# Patient Record
Sex: FEMALE | Race: WHITE | Employment: OTHER | URBAN - METROPOLITAN AREA
[De-identification: names, ages, dates, MRNs, and addresses within clinical notes are randomized per-mention and may not be internally consistent; named-entity substitution may affect disease eponyms.]

---

## 2017-05-29 ENCOUNTER — HOSPITAL ENCOUNTER (EMERGENCY)
Age: 82
Discharge: HOME | End: 2017-05-29
Attending: EMERGENCY MEDICINE
Payer: MEDICARE

## 2017-05-29 VITALS
HEART RATE: 83 BPM | TEMPERATURE: 98 F | DIASTOLIC BLOOD PRESSURE: 77 MMHG | SYSTOLIC BLOOD PRESSURE: 156 MMHG | RESPIRATION RATE: 16 BRPM | OXYGEN SATURATION: 99 %

## 2017-05-29 DIAGNOSIS — H91.91 HEARING LOSS OF RIGHT EAR, UNSPECIFIED HEARING LOSS TYPE: Primary | ICD-10-CM

## 2017-05-29 PROCEDURE — 99282 EMERGENCY DEPT VISIT SF MDM: CPT

## 2017-05-29 RX ORDER — CLOPIDOGREL BISULFATE 75 MG/1
75 TABLET ORAL
COMMUNITY

## 2017-05-29 RX ORDER — METOPROLOL SUCCINATE 50 MG/1
50 TABLET, EXTENDED RELEASE ORAL DAILY
COMMUNITY

## 2017-05-29 RX ORDER — RAMIPRIL 10 MG/1
10 CAPSULE ORAL DAILY
COMMUNITY

## 2017-05-29 RX ORDER — MELATONIN
1000 DAILY
COMMUNITY

## 2017-05-29 RX ORDER — PRAVASTATIN SODIUM 20 MG/1
20 TABLET ORAL
COMMUNITY

## 2017-05-29 RX ORDER — PREDNISONE 10 MG/1
TABLET ORAL
Qty: 21 TAB | Refills: 0 | Status: SHIPPED | OUTPATIENT
Start: 2017-05-29

## 2017-05-29 RX ORDER — CALCIUM CARBONATE 600 MG
1200 TABLET ORAL 2 TIMES DAILY
COMMUNITY

## 2017-05-29 RX ORDER — LORAZEPAM 0.5 MG/1
0.5 TABLET ORAL
COMMUNITY

## 2017-05-29 RX ORDER — PREDNISONE 20 MG/1
60 TABLET ORAL
Status: DISCONTINUED | OUTPATIENT
Start: 2017-05-29 | End: 2017-05-29

## 2017-05-29 NOTE — ED NOTES
Patient (s)  given copy of dc instructions and `1 script(s). Patient (s)  verbalized understanding of instructions and script (s). Patient given a current medication reconciliation form and verbalized understanding of their medications. Patient (s) verbalized understanding of the importance of discussing medications with  his or her physician or clinic they will be following up with. Patient alert and oriented and in no acute distress. Patient discharged home ambulatory with family.

## 2017-05-29 NOTE — DISCHARGE INSTRUCTIONS
Hearing Loss: Care Instructions  Your Care Instructions  Hearing loss is a sudden or slow decrease in how well you hear. It can range from mild to severe. Permanent hearing loss can occur with aging. It also can happen when you are exposed long-term to loud noise. Examples include listening to loud music, riding motorcycles, or being around other loud machines. Hearing loss can affect your work and home life. It can make you feel lonely or depressed. You may feel that you have lost your independence. But hearing aids and other devices can help you hear better and feel connected to others. Follow-up care is a key part of your treatment and safety. Be sure to make and go to all appointments, and call your doctor if you are having problems. It's also a good idea to know your test results and keep a list of the medicines you take. How can you care for yourself at home? · Avoid loud noises whenever possible. This helps keep your hearing from getting worse. · Always wear hearing protection around loud noises. · Wear a hearing aid as directed. See a person who can help you pick a hearing aid that fits you. · Have hearing tests as your doctor suggests. They can show whether your hearing has changed. Your hearing aid may need to be adjusted. · Use other devices as needed. These may include:  ¨ Telephone amplifiers and hearing aids that can connect to a television, stereo, radio, or microphone. ¨ Devices that use lights or vibrations. These alert you to the doorbell, a ringing telephone, or a baby monitor. ¨ Television closed-captioning. This shows the words at the bottom of the screen. Most new TVs can do this. Anahy Brooks (text telephone). This lets you type messages back and forth on the telephone instead of talking or listening. These devices are also called TDD. When messages are typed on the keyboard, they are sent over the phone line to a receiving TTY. The message is shown on a monitor.   · Use pagers, fax machines, and email if it is hard for you to communicate by telephone. · Try to learn a listening technique called speech-reading. It is not lip-reading. You pay attention to people's gestures, expressions, posture, and tone of voice. These clues can help you understand what a person is saying. Face the person you are talking to, and have him or her face you. Make sure the lighting is good. You need to see the other person's face clearly. · Think about counseling if you need help to adjust to your hearing loss. When should you call for help? Call 911 anytime you think you may need emergency care. For example, call if:  · You have hearing loss that occurs with an injury to the head or ear. · You have symptoms of a stroke. These may include:  ¨ Sudden numbness, tingling, weakness, or loss of movement in your face, arm, or leg, especially on only one side of your body. ¨ Sudden vision changes. ¨ Sudden trouble speaking. ¨ Sudden confusion or trouble understanding simple statements. ¨ Sudden problems with walking or balance. ¨ A sudden, severe headache that is different from past headaches. Call your doctor now or seek immediate medical care if:  · You have sudden, severe hearing loss. · You have nausea or vomiting. Watch closely for changes in your health, and be sure to contact your doctor if:  · You think your hearing is gradually getting worse. · You wonder if you need a hearing aid. Where can you learn more? Go to http://jade-ammy.info/. Enter B586 in the search box to learn more about \"Hearing Loss: Care Instructions. \"  Current as of: July 29, 2016  Content Version: 11.2  © 1844-8152 [employer]. Care instructions adapted under license by [employer] (which disclaims liability or warranty for this information).  If you have questions about a medical condition or this instruction, always ask your healthcare professional. Kalina Whitman disclaims any warranty or liability for your use of this information.

## 2017-05-29 NOTE — ED PROVIDER NOTES
HPI Comments: Toni Loving is a 80 y.o. female with a pertinent history of AFib, HTN, HLD who presents to the emergency department for evaluation of progressive hearing loss to right ear. Chronic left ear hearing loss. No recent trauma to ear/head. No recent URI symptoms. Denies ear pain. Does not clean ear with cutips. No history of similar in this ear. She states she already saw her PCP, who prescribed flonase and  referred her to ENT. However, she states she was concerned about the side effects of the flonase, so she did not take it. She came in today bc she cannot wait the 2 weeks until her appt with ENT. Pt denies any fevers or chills, headache, dizziness or light headedness, nasal congestion, rhinorrhea, sore throat, CP or discomfort, SOB, cough, n/v/d/c, abd pain, back pain, diaphoresis, melena/hematochezia, dysuria, hematuria, frequency, focal weakness/numbness/tingling, or rash. Patient has no other complaints at this time. PCP:  Girma Vasquez MD        Patient is a 80 y.o. female presenting with ear pain. Ear Pain    Associated symptoms include hearing loss. Pertinent negatives include no headaches, no rhinorrhea, no sore throat, no abdominal pain, no diarrhea, no vomiting, no cough and no rash. Past Medical History:   Diagnosis Date    A-fib ([address]     Hypercholesteremia     Hypertension     Spinal stenosis        Past Surgical History:   Procedure Laterality Date    HX BLADDER SUSPENSION      HX BREAST BIOPSY  1965    rt breast, benign    HX TONSILLECTOMY           No family history on file. Social History     Social History    Marital status:      Spouse name: N/A    Number of children: N/A    Years of education: N/A     Occupational History    Not on file.      Social History Main Topics    Smoking status: Never Smoker    Smokeless tobacco: Not on file    Alcohol use No    Drug use: Not on file    Sexual activity: Not on file     Other Topics Concern    Not on file     Social History Narrative         ALLERGIES: Review of patient's allergies indicates no known allergies. Review of Systems   Constitutional: Negative for chills and fever. HENT: Positive for hearing loss. Negative for congestion, ear pain, rhinorrhea and sore throat. Respiratory: Negative for cough and shortness of breath. Cardiovascular: Negative for chest pain. Gastrointestinal: Negative for abdominal pain, blood in stool, constipation, diarrhea, nausea and vomiting. Genitourinary: Negative for dysuria, frequency and hematuria. Musculoskeletal: Negative for back pain and myalgias. Skin: Negative for rash and wound. Neurological: Negative for dizziness and headaches. Vitals:    05/29/17 0731   BP: 156/77   Pulse: 83   Resp: 16   Temp: 98 °F (36.7 °C)   SpO2: 99%            Physical Exam   Constitutional: She is oriented to person, place, and time. She appears well-developed and well-nourished. No distress. HENT:   Head: Normocephalic and atraumatic. Dull right TM, but no evidence of effusion or trauma. No cerumen in EAC. Hearing intact, but reduced per patient   Eyes: Conjunctivae are normal.   Neck: Normal range of motion. Neck supple. Cardiovascular: Normal rate, regular rhythm and normal heart sounds. Pulmonary/Chest: Effort normal and breath sounds normal. No respiratory distress. She exhibits no tenderness. Abdominal: Soft. Bowel sounds are normal. She exhibits no distension. There is no tenderness. There is no rebound and no guarding. Musculoskeletal: She exhibits no edema or deformity. Neurological: She is alert and oriented to person, place, and time. She has normal reflexes. Skin: Skin is warm and dry. She is not diaphoretic. Psychiatric: She has a normal mood and affect. Nursing note and vitals reviewed.        MDM  Number of Diagnoses or Management Options  Hearing loss of right ear, unspecified hearing loss type:   Diagnosis management comments: Differential Diagnosis: Otitis media, serous otitis media, barotitis media, otitis externa, mastoiditis, barosinusitis, ear FB, furuncle, chondroma, bullous myringitis, TM perforation, tinnitus, lateral sinus thrombosis, pharyngitis, cellulitis, abscess    Plan:  Pt presents in NAD, vitals wnl. Unsure of etiology. No effusion, cerumen, HA, dizziness/lightheadedness. Offered steroid treatment, but patient refused, stating she would rather wait until she sees ENT. Wrote script anyway in off chance that she changes her mind. At this time, patient is stable and appropriate for discharge home. Patient demonstrates understanding of current diagnoses and is in agreement with the treatment plan. They are advised that while the likelihood of serious underlying condition is low at this point given the evaluation performed today, we cannot fully rule it out. They are advised to immediately return with any new symptoms or worsening of current condition. All questions have been answered. Patient is given educational material regarding their diagnoses, including danger symptoms and when to return to the ED. Amount and/or Complexity of Data Reviewed  Review and summarize past medical records: yes    Risk of Complications, Morbidity, and/or Mortality  Presenting problems: low  Diagnostic procedures: minimal  Management options: moderate    Patient Progress  Patient progress: improved    ED Course       Procedures    -------------------------------------------------------------------------------------------------------------------  Orders:  Orders Placed This Encounter    clopidogrel (PLAVIX) 75 mg tab     Sig: Take 75 mg by mouth.  LORazepam (ATIVAN) 0.5 mg tablet     Sig: Take 0.5 mg by mouth.  metoprolol succinate (TOPROL-XL) 50 mg XL tablet     Sig: Take 50 mg by mouth daily.  pravastatin (PRAVACHOL) 20 mg tablet     Sig: Take 20 mg by mouth nightly.     ramipril (ALTACE) 10 mg capsule     Sig: Take 10 mg by mouth daily.  calcium carbonate (CALTREX) 600 mg calcium (1,500 mg) tablet     Sig: Take 1,200 mg by mouth two (2) times a day.  cholecalciferol (VITAMIN D3) 1,000 unit tablet     Sig: Take 1,000 Units by mouth daily.  DISCONTD: predniSONE (DELTASONE) tablet 60 mg    predniSONE (DELTASONE) 10 mg tablet     Sig: Take six pills on Day 1, five pills on Day 2, four pills on Day 3, three pills on Day 4, two pills on Day 5, and one pill on Day 6. Dispense:  21 Tab     Refill:  0        Progress Notes:  8:22 AM:  Bertha Paul PA-C was at the pt's bedside, assessed the pt and answered the pt's questions regarding treatment.    -------------------------------------------------------------------------------------------------------------------    Disposition:  Diagnosis:   1. Hearing loss of right ear, unspecified hearing loss type        Disposition: ID Home    Follow-up Information     Follow up With Details Comments Contact Info    Chetna Cortes MD Call in 1 day For follow-up [address] Throat Specialist Call in 1 day For follow-up [address] [facility] [address]    [facility] EMERGENCY DEPT Go to As needed, If symptoms worsen [address] [place] [id number] [phone]          Patient's Medications   Start Taking    PREDNISONE (DELTASONE) 10 MG TABLET    Take six pills on Day 1, five pills on Day 2, four pills on Day 3, three pills on Day 4, two pills on Day 5, and one pill on Day 6. Continue Taking    CALCIUM CARBONATE (CALTREX) 600 MG CALCIUM (1,500 MG) TABLET    Take 1,200 mg by mouth two (2) times a day. CHOLECALCIFEROL (VITAMIN D3) 1,000 UNIT TABLET    Take 1,000 Units by mouth daily. CLOPIDOGREL (PLAVIX) 75 MG TAB    Take 75 mg by mouth. LORAZEPAM (ATIVAN) 0.5 MG TABLET    Take 0.5 mg by mouth.     METOPROLOL SUCCINATE (TOPROL-XL) 50 MG XL TABLET    Take 50 mg by mouth daily. PRAVASTATIN (PRAVACHOL) 20 MG TABLET    Take 20 mg by mouth nightly. RAMIPRIL (ALTACE) 10 MG CAPSULE    Take 10 mg by mouth daily.    These Medications have changed    No medications on file   Stop Taking    No medications on file

## 2018-01-30 ENCOUNTER — HOSPITAL ENCOUNTER (OUTPATIENT)
Dept: MAMMOGRAPHY | Age: 83
Discharge: HOME | End: 2018-01-30
Attending: INTERNAL MEDICINE
Payer: MEDICARE

## 2018-01-30 DIAGNOSIS — Z12.31 VISIT FOR SCREENING MAMMOGRAM: ICD-10-CM

## 2018-01-30 PROCEDURE — 77063 BREAST TOMOSYNTHESIS BI: CPT

## 2018-01-30 PROCEDURE — 77067 SCR MAMMO BI INCL CAD: CPT

## 2018-12-14 ENCOUNTER — HOSPITAL ENCOUNTER (OUTPATIENT)
Dept: INFUSION THERAPY | Age: 83
Discharge: HOME | End: 2018-12-14
Payer: MEDICARE

## 2018-12-14 VITALS
HEART RATE: 79 BPM | DIASTOLIC BLOOD PRESSURE: 49 MMHG | OXYGEN SATURATION: 98 % | SYSTOLIC BLOOD PRESSURE: 109 MMHG | TEMPERATURE: 97.1 F | RESPIRATION RATE: 18 BRPM

## 2018-12-14 PROCEDURE — 86920 COMPATIBILITY TEST SPIN: CPT

## 2018-12-14 PROCEDURE — 36415 COLL VENOUS BLD VENIPUNCTURE: CPT

## 2018-12-14 PROCEDURE — 86900 BLOOD TYPING SEROLOGIC ABO: CPT

## 2018-12-14 RX ORDER — SODIUM CHLORIDE 9 MG/ML
250 INJECTION, SOLUTION INTRAVENOUS AS NEEDED
Status: DISCONTINUED | OUTPATIENT
Start: 2018-12-14 | End: 2018-12-18 | Stop reason: HOSPADM

## 2018-12-14 NOTE — PROGRESS NOTES
[facility] Lab Visit:    Marlen Whatley  1/18/1923  [id number]    1440:  Pt arrived ambulatory. Labs drawn peripherally and sent for processing. Departed [facility] ambulatory and in no distress.     Visit Vitals  /49 (BP 1 Location: Right arm, BP Patient Position: Sitting)   Pulse 79   Temp 97.1 °F (36.2 °C)   Resp 18   SpO2 98%

## 2018-12-17 ENCOUNTER — HOSPITAL ENCOUNTER (OUTPATIENT)
Dept: INFUSION THERAPY | Age: 83
Discharge: HOME | End: 2018-12-17
Payer: MEDICARE

## 2018-12-17 VITALS
HEART RATE: 69 BPM | TEMPERATURE: 97.7 F | RESPIRATION RATE: 18 BRPM | OXYGEN SATURATION: 99 % | SYSTOLIC BLOOD PRESSURE: 144 MMHG | DIASTOLIC BLOOD PRESSURE: 73 MMHG

## 2018-12-17 PROCEDURE — 74011250636 HC RX REV CODE- 250/636: Performed by: INTERNAL MEDICINE

## 2018-12-17 PROCEDURE — P9016 RBC LEUKOCYTES REDUCED: HCPCS

## 2018-12-17 PROCEDURE — 77030013169 SET IV BLD ICUM -A

## 2018-12-17 PROCEDURE — 74011250637 HC RX REV CODE- 250/637: Performed by: INTERNAL MEDICINE

## 2018-12-17 PROCEDURE — 36430 TRANSFUSION BLD/BLD COMPNT: CPT

## 2018-12-17 RX ORDER — SODIUM CHLORIDE 0.9 % (FLUSH) 0.9 %
10-40 SYRINGE (ML) INJECTION AS NEEDED
Status: DISCONTINUED | OUTPATIENT
Start: 2018-12-17 | End: 2018-12-20 | Stop reason: HOSPADM

## 2018-12-17 RX ORDER — ACETAMINOPHEN 325 MG/1
650 TABLET ORAL ONCE
Status: COMPLETED | OUTPATIENT
Start: 2018-12-17 | End: 2018-12-17

## 2018-12-17 RX ORDER — SODIUM CHLORIDE 9 MG/ML
25 INJECTION, SOLUTION INTRAVENOUS ONCE
Status: COMPLETED | OUTPATIENT
Start: 2018-12-17 | End: 2018-12-17

## 2018-12-17 RX ORDER — DIPHENHYDRAMINE HCL 25 MG
25 CAPSULE ORAL ONCE
Status: COMPLETED | OUTPATIENT
Start: 2018-12-17 | End: 2018-12-17

## 2018-12-17 RX ADMIN — ACETAMINOPHEN 650 MG: 325 TABLET ORAL at 08:52

## 2018-12-17 RX ADMIN — Medication 10 ML: at 11:44

## 2018-12-17 RX ADMIN — SODIUM CHLORIDE 25 ML/HR: 900 INJECTION, SOLUTION INTRAVENOUS at 09:15

## 2018-12-17 RX ADMIN — Medication 10 ML: at 08:52

## 2018-12-17 RX ADMIN — DIPHENHYDRAMINE HYDROCHLORIDE 25 MG: 25 CAPSULE ORAL at 08:51

## 2018-12-17 NOTE — PROGRESS NOTES
[facility] Progress Note    Date: 2018    Name: Yuval Clayton    MRN: [id number]         : 1923      Ms. Palacios arrived to [facility] at 0830 accompanied by her son. Consent and care notes reviewed and signed. Ms. Pattie Mohan was assessed and education was provided. Discussed risks and benefits of blood transfusion with patient, including risk of transfusion reaction and disease transmission. Patient verbalized understanding and signed consent placed on chart. Ms. Naya Byrne vitals were reviewed. Visit Vitals  /73 (BP 1 Location: Right arm, BP Patient Position: Sitting)   Pulse 69   Temp 97.7 °F (36.5 °C)   Resp 18   SpO2 99%   Breastfeeding? No       24g IV inserted in patient's Left antecubital left, condition patent and no redness x2 attempt. Positive for blood return and flushes without difficulty. Normal saline initiated at [facility]. Pre-medications (Tylenol 650 mg and Benadryl 25 mg PO) were administered as ordered. First unit of one ordered units of PRBCs initiated @ 75 ml/hr at 0920. Fifteen minutes into infusion, VS stable and pt denied c/o SOB, itching/hives, lip/tongue/facial swelling, CP or other complaints. Infusion rate increased to 155 ml/hr for the remainder of the transfusion. Unit finished @ 1120. VS stable and no transfusion reaction suspected. Ms. Pattie Mohan tolerated infusion without complaints. IV removed. No irritation, bleeding, or hematoma noted at site. Gauze and coban applied to site. Patient remained in [facility] for 30 minutes for observation. No signs of allergic reaction noted. Patient declined to stay for hour observation. Discharge instructions reviewed with pt. Pt instructed to report SOB, CP, elevated temp, back pain, or other symptoms of transfusion reaction to MD or ED. Pt verbalized understanding. Patient armband removed and shredded    Ms. Palacios was discharged from [place] [address] in stable condition at 7704.  She is to follow up with Dr. Emily Scott as scheduled.     Suzette Ellis RN  December 17, 2018   2453

## 2018-12-18 LAB
ABO + RH BLD: NORMAL
BLD PROD TYP BPU: NORMAL
BLOOD GROUP ANTIBODIES SERPL: NORMAL
BPU ID: NORMAL
CROSSMATCH RESULT,%XM: NORMAL
SPECIMEN EXP DATE BLD: NORMAL
STATUS OF UNIT,%ST: NORMAL
UNIT DIVISION, %UDIV: 0

## 2019-02-04 ENCOUNTER — HOSPITAL ENCOUNTER (OUTPATIENT)
Dept: INFUSION THERAPY | Age: 84
Discharge: HOME | End: 2019-02-04
Payer: MEDICARE

## 2019-02-04 VITALS
RESPIRATION RATE: 18 BRPM | HEART RATE: 76 BPM | SYSTOLIC BLOOD PRESSURE: 143 MMHG | OXYGEN SATURATION: 100 % | DIASTOLIC BLOOD PRESSURE: 53 MMHG | TEMPERATURE: 98.3 F

## 2019-02-04 PROCEDURE — 86900 BLOOD TYPING SEROLOGIC ABO: CPT

## 2019-02-04 PROCEDURE — 86923 COMPATIBILITY TEST ELECTRIC: CPT

## 2019-02-04 PROCEDURE — 36415 COLL VENOUS BLD VENIPUNCTURE: CPT

## 2019-02-04 RX ORDER — SODIUM CHLORIDE 9 MG/ML
250 INJECTION, SOLUTION INTRAVENOUS AS NEEDED
Status: DISCONTINUED | OUTPATIENT
Start: 2019-02-04 | End: 2019-02-08 | Stop reason: HOSPADM

## 2019-02-05 ENCOUNTER — HOSPITAL ENCOUNTER (OUTPATIENT)
Dept: INFUSION THERAPY | Age: 84
Discharge: HOME | End: 2019-02-05
Payer: MEDICARE

## 2019-02-05 VITALS
RESPIRATION RATE: 18 BRPM | TEMPERATURE: 98 F | HEART RATE: 65 BPM | OXYGEN SATURATION: 100 % | SYSTOLIC BLOOD PRESSURE: 153 MMHG | DIASTOLIC BLOOD PRESSURE: 64 MMHG

## 2019-02-05 LAB
ABO + RH BLD: NORMAL
BLD PROD TYP BPU: NORMAL
BLD PROD TYP BPU: NORMAL
BLOOD GROUP ANTIBODIES SERPL: NORMAL
BPU ID: NORMAL
BPU ID: NORMAL
CROSSMATCH RESULT,%XM: NORMAL
CROSSMATCH RESULT,%XM: NORMAL
SPECIMEN EXP DATE BLD: NORMAL
STATUS OF UNIT,%ST: NORMAL
STATUS OF UNIT,%ST: NORMAL
UNIT DIVISION, %UDIV: 0
UNIT DIVISION, %UDIV: 0

## 2019-02-05 PROCEDURE — 36430 TRANSFUSION BLD/BLD COMPNT: CPT

## 2019-02-05 PROCEDURE — 77030013169 SET IV BLD ICUM -A

## 2019-02-05 PROCEDURE — P9016 RBC LEUKOCYTES REDUCED: HCPCS

## 2019-02-05 PROCEDURE — 74011250637 HC RX REV CODE- 250/637: Performed by: INTERNAL MEDICINE

## 2019-02-05 RX ORDER — GUAIFENESIN 100 MG/5ML
81 LIQUID (ML) ORAL DAILY
COMMUNITY

## 2019-02-05 RX ORDER — DIPHENHYDRAMINE HCL 25 MG
25 CAPSULE ORAL ONCE
Status: COMPLETED | OUTPATIENT
Start: 2019-02-05 | End: 2019-02-05

## 2019-02-05 RX ORDER — SODIUM CHLORIDE 9 MG/ML
250 INJECTION, SOLUTION INTRAVENOUS AS NEEDED
Status: DISCONTINUED | OUTPATIENT
Start: 2019-02-05 | End: 2019-02-09 | Stop reason: HOSPADM

## 2019-02-05 RX ORDER — SODIUM CHLORIDE 0.9 % (FLUSH) 0.9 %
10-40 SYRINGE (ML) INJECTION AS NEEDED
Status: DISCONTINUED | OUTPATIENT
Start: 2019-02-05 | End: 2019-02-09 | Stop reason: HOSPADM

## 2019-02-05 RX ORDER — ACETAMINOPHEN 325 MG/1
650 TABLET ORAL ONCE
Status: COMPLETED | OUTPATIENT
Start: 2019-02-05 | End: 2019-02-05

## 2019-02-05 RX ADMIN — ACETAMINOPHEN 650 MG: 325 TABLET ORAL at 10:10

## 2019-02-05 RX ADMIN — DIPHENHYDRAMINE HYDROCHLORIDE 25 MG: 25 CAPSULE ORAL at 10:10

## 2019-02-05 NOTE — PROGRESS NOTES
[facility] Progress Note Date: 2019 Name: Nirmala Chou MRN: [id number] : 1923 Ms. Palacios arrived to [place] at [phone] accompanied by her son. Consent signed and on chart. Ms. Jonh Huff was assessed and education was provided. Discussed risks and benefits of blood transfusion with patient, including risk of transfusion reaction and disease transmission. Patient verbalized understanding and signed consent placed on chart. Ms. Rahul Em vitals were reviewed. Visit Vitals /64 (BP 1 Location: Left arm, BP Patient Position: Sitting) Pulse 65 Temp 98 °F (36.7 °C) Resp 18 SpO2 100% Breastfeeding? No  
 
 
22g IV inserted in patient's left fore arm, condition patent and no redness x1 attempt. Positive for blood return and flushes without difficulty. Normal saline initiated at [facility]. Pre-medications (Tylenol 650 mg and Benadryl 25 mg PO) were administered as ordered. First unit of one ordered units of PRBCs initiated @ 75 ml/hr at 1040. Fifteen minutes into infusion, VS stable and pt denied c/o SOB, itching/hives, lip/tongue/facial swelling, CP or other complaints. Infusion rate increased to 155 ml/hr for the remainder of the transfusion. Unit finished @ 1310. VS stable and no transfusion reaction suspected. Ms. Jonh Huff tolerated infusion without complaints. IV removed. No irritation, bleeding, or hematoma noted at site. Gauze and coban applied to site. Patient declined to stay for an observation period. Discharge instructions reviewed with pt. Pt instructed to report SOB, CP, elevated temp, back pain, or other symptoms of transfusion reaction to MD or ED. Pt verbalized understanding. Patient armband removed and shredded Ms. Palacios was discharged from [place] [address] in stable condition at 1310. She is to follow up with Dr. Alok Evans as scheduled. Marcelle White RN 2019

## 2019-03-21 ENCOUNTER — HOSPITAL ENCOUNTER (OUTPATIENT)
Dept: MAMMOGRAPHY | Age: 84
Discharge: HOME | End: 2019-03-21
Attending: INTERNAL MEDICINE
Payer: MEDICARE

## 2019-03-21 DIAGNOSIS — Z12.31 VISIT FOR SCREENING MAMMOGRAM: ICD-10-CM

## 2019-03-21 PROCEDURE — 77063 BREAST TOMOSYNTHESIS BI: CPT

## 2025-05-27 ENCOUNTER — APPOINTMENT (OUTPATIENT)
Facility: HOSPITAL | Age: 89
DRG: 379 | End: 2025-05-27
Payer: MEDICARE

## 2025-05-27 ENCOUNTER — HOSPITAL ENCOUNTER (INPATIENT)
Facility: HOSPITAL | Age: 89
LOS: 1 days | Discharge: HOME | DRG: 379 | End: 2025-05-29
Attending: EMERGENCY MEDICINE | Admitting: INTERNAL MEDICINE
Payer: MEDICARE

## 2025-05-27 DIAGNOSIS — N18.32 STAGE 3B CHRONIC KIDNEY DISEASE (HCC): ICD-10-CM

## 2025-05-27 DIAGNOSIS — K92.0 COFFEE GROUND EMESIS: Primary | ICD-10-CM

## 2025-05-27 LAB
ANION GAP SERPL CALC-SCNC: 10 MMOL/L (ref 3–18)
ANION GAP SERPL CALC-SCNC: 15 MMOL/L (ref 3–18)
BASOPHILS # BLD: 0.02 K/UL (ref 0–0.1)
BASOPHILS NFR BLD: 0.2 % (ref 0–2)
BUN SERPL-MCNC: 34 MG/DL (ref 6–23)
BUN SERPL-MCNC: 38 MG/DL (ref 6–23)
BUN/CREAT SERPL: 23 (ref 12–20)
BUN/CREAT SERPL: 28 (ref 12–20)
CALCIUM SERPL-MCNC: 9.1 MG/DL (ref 8.5–10.1)
CALCIUM SERPL-MCNC: 9.6 MG/DL (ref 8.5–10.1)
CHLORIDE SERPL-SCNC: 101 MMOL/L (ref 98–107)
CHLORIDE SERPL-SCNC: 98 MMOL/L (ref 98–107)
CO2 SERPL-SCNC: 20 MMOL/L (ref 21–32)
CO2 SERPL-SCNC: 24 MMOL/L (ref 21–32)
CREAT SERPL-MCNC: 1.35 MG/DL (ref 0.6–1.3)
CREAT SERPL-MCNC: 1.5 MG/DL (ref 0.6–1.3)
DIFFERENTIAL METHOD BLD: ABNORMAL
EOSINOPHIL # BLD: 0 K/UL (ref 0–0.4)
EOSINOPHIL NFR BLD: 0 % (ref 0–5)
ERYTHROCYTE [DISTWIDTH] IN BLOOD BY AUTOMATED COUNT: 13.2 % (ref 11.6–14.5)
GLUCOSE SERPL-MCNC: 119 MG/DL (ref 74–108)
GLUCOSE SERPL-MCNC: 183 MG/DL (ref 74–108)
HCT VFR BLD AUTO: 31.1 % (ref 35–45)
HEMOCCULT STL QL: NEGATIVE
HGB BLD-MCNC: 10.3 G/DL (ref 12–16)
IMM GRANULOCYTES # BLD AUTO: 0.05 K/UL (ref 0–0.04)
IMM GRANULOCYTES NFR BLD AUTO: 0.4 % (ref 0–0.5)
INR PPP: 1.1 (ref 0.9–1.1)
LYMPHOCYTES # BLD: 0.79 K/UL (ref 0.9–3.6)
LYMPHOCYTES NFR BLD: 6.3 % (ref 21–52)
MCH RBC QN AUTO: 31.3 PG (ref 24–34)
MCHC RBC AUTO-ENTMCNC: 33.1 G/DL (ref 31–37)
MCV RBC AUTO: 94.5 FL (ref 78–100)
MONOCYTES # BLD: 0.55 K/UL (ref 0.05–1.2)
MONOCYTES NFR BLD: 4.4 % (ref 3–10)
NEUTS SEG # BLD: 11.15 K/UL (ref 1.8–8)
NEUTS SEG NFR BLD: 88.7 % (ref 40–73)
NRBC # BLD: 0 K/UL (ref 0–0.01)
NRBC BLD-RTO: 0 PER 100 WBC
PLATELET # BLD AUTO: 272 K/UL (ref 135–420)
PMV BLD AUTO: 10 FL (ref 9.2–11.8)
POTASSIUM SERPL-SCNC: 4.2 MMOL/L (ref 3.5–5.5)
POTASSIUM SERPL-SCNC: 5.6 MMOL/L (ref 3.5–5.5)
PROTHROMBIN TIME: 13.9 SEC (ref 11.9–14.9)
RBC # BLD AUTO: 3.29 M/UL (ref 4.2–5.3)
SODIUM SERPL-SCNC: 133 MMOL/L (ref 136–145)
SODIUM SERPL-SCNC: 135 MMOL/L (ref 136–145)
WBC # BLD AUTO: 12.6 K/UL (ref 4.6–13.2)

## 2025-05-27 PROCEDURE — 74174 CTA ABD&PLVS W/CONTRAST: CPT

## 2025-05-27 PROCEDURE — 85025 COMPLETE CBC W/AUTO DIFF WBC: CPT

## 2025-05-27 PROCEDURE — 99285 EMERGENCY DEPT VISIT HI MDM: CPT

## 2025-05-27 PROCEDURE — 82272 OCCULT BLD FECES 1-3 TESTS: CPT

## 2025-05-27 PROCEDURE — 6360000004 HC RX CONTRAST MEDICATION: Performed by: EMERGENCY MEDICINE

## 2025-05-27 PROCEDURE — 80048 BASIC METABOLIC PNL TOTAL CA: CPT

## 2025-05-27 PROCEDURE — 85610 PROTHROMBIN TIME: CPT

## 2025-05-27 RX ORDER — IODIXANOL 320 MG/ML
80 INJECTION, SOLUTION INTRAVASCULAR
Status: COMPLETED | OUTPATIENT
Start: 2025-05-27 | End: 2025-05-27

## 2025-05-27 RX ADMIN — IODIXANOL 80 ML: 320 INJECTION, SOLUTION INTRAVASCULAR at 22:16

## 2025-05-27 NOTE — ED PROVIDER NOTES
EMERGENCY DEPARTMENT HISTORY AND PHYSICAL EXAM      Date: 5/27/2025  Patient Name: Monet Humphrey      History of Presenting Illness     Chief Complaint   Patient presents with    Abdominal Pain     With coffee ground emesis       Location/Duration/Severity/Modifying factors   Chief Complaint   Patient presents with    Abdominal Pain     With coffee ground emesis       HPI:  Monet Humphrey is a 89 y.o. female with PMH significant for none on file, patient unable to provide any history presents with coffee-ground emesis noted at home by son.  Patient able to provide any history.  Does complain of generalized abdominal pain.  No other complaints at this time.    PCP: Imani Hall MD    Current Facility-Administered Medications   Medication Dose Route Frequency Provider Last Rate Last Admin    pantoprazole (PROTONIX) 80 mg in sodium chloride (PF) 0.9 % 20 mL injection  80 mg IntraVENous NOW Jose Antonio Truong DO         No current outpatient medications on file.       Past History     Past Medical History:  Past Medical History:   Diagnosis Date    Anemia     Aortic stenosis     Arthritis     History of transcatheter aortic valve replacement (TAVR)     Hyperlipidemia     Hypertension     Spinal stenosis        Past Surgical History:  Past Surgical History:   Procedure Laterality Date    BLADDER SUSPENSION      TRANSCATHETER AORTIC VALVE REPLACEMENT         Family History:  History reviewed. No pertinent family history.    Social History:  Social History     Tobacco Use    Smoking status: Never    Smokeless tobacco: Never   Vaping Use    Vaping status: Never Used   Substance Use Topics    Alcohol use: Never    Drug use: Never       Allergies:  No Known Allergies      Physical Exam     General: Patient is awake and alert, resting comfortably in no acute distress.  Cardiovascular: RRR, no murmurs.  Respiratory: Normal respiratory effort. Lung sounds clear to auscultation.  GI: soft, diffusely tender to

## 2025-05-28 ENCOUNTER — APPOINTMENT (OUTPATIENT)
Facility: HOSPITAL | Age: 89
DRG: 379 | End: 2025-05-28
Payer: MEDICARE

## 2025-05-28 PROBLEM — Z51.5 ENCOUNTER FOR PALLIATIVE CARE: Status: ACTIVE | Noted: 2025-05-28

## 2025-05-28 PROBLEM — R54 ADVANCED AGE: Status: ACTIVE | Noted: 2025-05-28

## 2025-05-28 PROBLEM — I10 HYPERTENSION: Status: ACTIVE | Noted: 2025-05-28

## 2025-05-28 PROBLEM — K92.2 UPPER GI BLEEDING: Status: ACTIVE | Noted: 2025-05-28

## 2025-05-28 PROBLEM — N17.9 ACUTE KIDNEY INJURY: Status: ACTIVE | Noted: 2025-05-28

## 2025-05-28 PROBLEM — I50.32 CHRONIC HEART FAILURE WITH PRESERVED EJECTION FRACTION (HCC): Status: ACTIVE | Noted: 2025-05-28

## 2025-05-28 PROBLEM — K92.0 COFFEE GROUND EMESIS: Status: RESOLVED | Noted: 2025-05-27 | Resolved: 2025-05-28

## 2025-05-28 PROBLEM — Z71.89 GOALS OF CARE, COUNSELING/DISCUSSION: Status: ACTIVE | Noted: 2025-05-28

## 2025-05-28 LAB
ALBUMIN SERPL-MCNC: 3 G/DL (ref 3.4–5)
ALBUMIN/GLOB SERPL: 0.6 (ref 0.8–1.7)
ALP SERPL-CCNC: 57 U/L (ref 45–117)
ALT SERPL-CCNC: 10 U/L (ref 10–35)
ANION GAP SERPL CALC-SCNC: 12 MMOL/L (ref 3–18)
AST SERPL-CCNC: 31 U/L (ref 10–38)
BILIRUB SERPL-MCNC: 0.4 MG/DL (ref 0.2–1)
BUN SERPL-MCNC: 39 MG/DL (ref 6–23)
BUN/CREAT SERPL: 27 (ref 12–20)
CALCIUM SERPL-MCNC: 9.2 MG/DL (ref 8.5–10.1)
CHLORIDE SERPL-SCNC: 101 MMOL/L (ref 98–107)
CO2 SERPL-SCNC: 21 MMOL/L (ref 21–32)
CREAT SERPL-MCNC: 1.45 MG/DL (ref 0.6–1.3)
GLOBULIN SER CALC-MCNC: 5.1 G/DL (ref 2–4)
GLUCOSE SERPL-MCNC: 99 MG/DL (ref 74–108)
HCT VFR BLD AUTO: 30.7 % (ref 35–45)
HCT VFR BLD AUTO: 31.2 % (ref 35–45)
HCT VFR BLD AUTO: 33.2 % (ref 35–45)
HGB BLD-MCNC: 10.2 G/DL (ref 12–16)
HGB BLD-MCNC: 10.8 G/DL (ref 12–16)
HGB BLD-MCNC: 9.9 G/DL (ref 12–16)
POTASSIUM SERPL-SCNC: 4.8 MMOL/L (ref 3.5–5.5)
PROT SERPL-MCNC: 8.1 G/DL (ref 6.4–8.2)
SODIUM SERPL-SCNC: 135 MMOL/L (ref 136–145)

## 2025-05-28 PROCEDURE — 73521 X-RAY EXAM HIPS BI 2 VIEWS: CPT

## 2025-05-28 PROCEDURE — 85018 HEMOGLOBIN: CPT

## 2025-05-28 PROCEDURE — 2580000003 HC RX 258: Performed by: INTERNAL MEDICINE

## 2025-05-28 PROCEDURE — 1100000003 HC PRIVATE W/ TELEMETRY

## 2025-05-28 PROCEDURE — 6360000002 HC RX W HCPCS: Performed by: EMERGENCY MEDICINE

## 2025-05-28 PROCEDURE — 99223 1ST HOSP IP/OBS HIGH 75: CPT | Performed by: NURSE PRACTITIONER

## 2025-05-28 PROCEDURE — 6370000000 HC RX 637 (ALT 250 FOR IP): Performed by: STUDENT IN AN ORGANIZED HEALTH CARE EDUCATION/TRAINING PROGRAM

## 2025-05-28 PROCEDURE — 2500000003 HC RX 250 WO HCPCS: Performed by: INTERNAL MEDICINE

## 2025-05-28 PROCEDURE — 36415 COLL VENOUS BLD VENIPUNCTURE: CPT

## 2025-05-28 PROCEDURE — 99222 1ST HOSP IP/OBS MODERATE 55: CPT | Performed by: INTERNAL MEDICINE

## 2025-05-28 PROCEDURE — 2580000003 HC RX 258: Performed by: STUDENT IN AN ORGANIZED HEALTH CARE EDUCATION/TRAINING PROGRAM

## 2025-05-28 PROCEDURE — 80053 COMPREHEN METABOLIC PANEL: CPT

## 2025-05-28 PROCEDURE — 96374 THER/PROPH/DIAG INJ IV PUSH: CPT

## 2025-05-28 PROCEDURE — 2580000003 HC RX 258: Performed by: EMERGENCY MEDICINE

## 2025-05-28 PROCEDURE — 94761 N-INVAS EAR/PLS OXIMETRY MLT: CPT

## 2025-05-28 PROCEDURE — 85014 HEMATOCRIT: CPT

## 2025-05-28 PROCEDURE — 6360000002 HC RX W HCPCS: Performed by: STUDENT IN AN ORGANIZED HEALTH CARE EDUCATION/TRAINING PROGRAM

## 2025-05-28 RX ORDER — METOPROLOL SUCCINATE 50 MG/1
50 TABLET, EXTENDED RELEASE ORAL DAILY
Status: DISCONTINUED | OUTPATIENT
Start: 2025-05-28 | End: 2025-05-29 | Stop reason: HOSPADM

## 2025-05-28 RX ORDER — PRAVASTATIN SODIUM 20 MG
20 TABLET ORAL DAILY
COMMUNITY

## 2025-05-28 RX ORDER — SODIUM CHLORIDE 0.9 % (FLUSH) 0.9 %
5-40 SYRINGE (ML) INJECTION EVERY 12 HOURS SCHEDULED
Status: DISCONTINUED | OUTPATIENT
Start: 2025-05-28 | End: 2025-05-29 | Stop reason: HOSPADM

## 2025-05-28 RX ORDER — SODIUM CHLORIDE 0.9 % (FLUSH) 0.9 %
5-40 SYRINGE (ML) INJECTION PRN
Status: DISCONTINUED | OUTPATIENT
Start: 2025-05-28 | End: 2025-05-29 | Stop reason: HOSPADM

## 2025-05-28 RX ORDER — FERROUS SULFATE 325(65) MG
325 TABLET ORAL EVERY OTHER DAY
Status: DISCONTINUED | OUTPATIENT
Start: 2025-05-29 | End: 2025-05-29 | Stop reason: HOSPADM

## 2025-05-28 RX ORDER — RAMIPRIL 10 MG/1
10 CAPSULE ORAL DAILY
COMMUNITY

## 2025-05-28 RX ORDER — METOPROLOL SUCCINATE 50 MG/1
50 TABLET, EXTENDED RELEASE ORAL DAILY
COMMUNITY

## 2025-05-28 RX ORDER — ONDANSETRON 2 MG/ML
4 INJECTION INTRAMUSCULAR; INTRAVENOUS EVERY 6 HOURS PRN
Status: DISCONTINUED | OUTPATIENT
Start: 2025-05-28 | End: 2025-05-29 | Stop reason: HOSPADM

## 2025-05-28 RX ORDER — ACETAMINOPHEN 325 MG/1
650 TABLET ORAL EVERY 6 HOURS PRN
Status: DISCONTINUED | OUTPATIENT
Start: 2025-05-28 | End: 2025-05-29 | Stop reason: HOSPADM

## 2025-05-28 RX ORDER — PHENOL 1.4 %
1 AEROSOL, SPRAY (ML) MUCOUS MEMBRANE DAILY
COMMUNITY

## 2025-05-28 RX ORDER — ONDANSETRON 4 MG/1
4 TABLET, ORALLY DISINTEGRATING ORAL EVERY 8 HOURS PRN
Status: DISCONTINUED | OUTPATIENT
Start: 2025-05-28 | End: 2025-05-29 | Stop reason: HOSPADM

## 2025-05-28 RX ORDER — PSYLLIUM HUSK 0.4 G
1000 CAPSULE ORAL DAILY
COMMUNITY

## 2025-05-28 RX ORDER — ASPIRIN 81 MG/1
81 TABLET, CHEWABLE ORAL DAILY
COMMUNITY

## 2025-05-28 RX ORDER — SODIUM CHLORIDE 9 MG/ML
INJECTION, SOLUTION INTRAVENOUS CONTINUOUS
Status: DISCONTINUED | OUTPATIENT
Start: 2025-05-28 | End: 2025-05-29

## 2025-05-28 RX ORDER — ACETAMINOPHEN 650 MG/1
650 SUPPOSITORY RECTAL EVERY 6 HOURS PRN
Status: DISCONTINUED | OUTPATIENT
Start: 2025-05-28 | End: 2025-05-29 | Stop reason: HOSPADM

## 2025-05-28 RX ORDER — VITAMIN B COMPLEX
1000 TABLET ORAL DAILY
Status: DISCONTINUED | OUTPATIENT
Start: 2025-05-28 | End: 2025-05-29 | Stop reason: HOSPADM

## 2025-05-28 RX ORDER — SODIUM CHLORIDE 9 MG/ML
INJECTION, SOLUTION INTRAVENOUS PRN
Status: DISCONTINUED | OUTPATIENT
Start: 2025-05-28 | End: 2025-05-29 | Stop reason: HOSPADM

## 2025-05-28 RX ORDER — PRAVASTATIN SODIUM 20 MG
20 TABLET ORAL DAILY
Status: DISCONTINUED | OUTPATIENT
Start: 2025-05-28 | End: 2025-05-29 | Stop reason: HOSPADM

## 2025-05-28 RX ADMIN — SODIUM CHLORIDE: 0.9 INJECTION, SOLUTION INTRAVENOUS at 18:40

## 2025-05-28 RX ADMIN — METOPROLOL SUCCINATE 50 MG: 50 TABLET, EXTENDED RELEASE ORAL at 14:45

## 2025-05-28 RX ADMIN — Medication 600 MG: at 14:45

## 2025-05-28 RX ADMIN — SODIUM CHLORIDE, PRESERVATIVE FREE 10 ML: 5 INJECTION INTRAVENOUS at 21:50

## 2025-05-28 RX ADMIN — Medication 1000 UNITS: at 14:45

## 2025-05-28 RX ADMIN — SODIUM CHLORIDE, PRESERVATIVE FREE 10 ML: 5 INJECTION INTRAVENOUS at 12:23

## 2025-05-28 RX ADMIN — SODIUM CHLORIDE 40 MG: 9 INJECTION, SOLUTION INTRAMUSCULAR; INTRAVENOUS; SUBCUTANEOUS at 12:23

## 2025-05-28 RX ADMIN — SODIUM CHLORIDE: 0.9 INJECTION, SOLUTION INTRAVENOUS at 04:11

## 2025-05-28 RX ADMIN — PRAVASTATIN SODIUM 20 MG: 20 TABLET ORAL at 21:50

## 2025-05-28 RX ADMIN — SODIUM CHLORIDE 80 MG: 9 INJECTION, SOLUTION INTRAMUSCULAR; INTRAVENOUS; SUBCUTANEOUS at 00:10

## 2025-05-28 ASSESSMENT — PAIN SCALES - PAIN ASSESSMENT IN ADVANCED DEMENTIA (PAINAD)
BODYLANGUAGE: RELAXED
BODYLANGUAGE: RELAXED
CONSOLABILITY: NO NEED TO CONSOLE
CONSOLABILITY: NO NEED TO CONSOLE
FACIALEXPRESSION: SMILING OR INEXPRESSIVE
BODYLANGUAGE: RELAXED
TOTALSCORE: 0
FACIALEXPRESSION: SMILING OR INEXPRESSIVE
BODYLANGUAGE: RELAXED
BREATHING: NORMAL
BREATHING: NORMAL
CONSOLABILITY: NO NEED TO CONSOLE
BREATHING: NORMAL
FACIALEXPRESSION: SMILING OR INEXPRESSIVE
CONSOLABILITY: NO NEED TO CONSOLE
TOTALSCORE: 0
BREATHING: NORMAL
FACIALEXPRESSION: SMILING OR INEXPRESSIVE
TOTALSCORE: 0
CONSOLABILITY: NO NEED TO CONSOLE
BREATHING: NORMAL
FACIALEXPRESSION: SMILING OR INEXPRESSIVE
BODYLANGUAGE: RELAXED

## 2025-05-28 ASSESSMENT — PAIN SCALES - GENERAL
PAINLEVEL_OUTOF10: 0

## 2025-05-28 ASSESSMENT — PAIN SCALES - WONG BAKER
WONGBAKER_NUMERICALRESPONSE: NO HURT

## 2025-05-28 NOTE — CARE COORDINATION
Case Management Assessment  Initial Evaluation    Date/Time of Evaluation: 5/28/2025 5:00 PM  Assessment Completed by: Gina Espinosa      Patient Name: Monet Humphrey                   YOB: 1923  Diagnosis: Upper GI bleeding [K92.2]  Coffee ground emesis [K92.0]  Stage 3b chronic kidney disease (HCC) [N18.32]                   Date / Time: 5/27/2025  7:35 PM      Met with patient in room. HIPAA verified. Initial case management admission assessment completed with patient's permission. Patient Demographics verified. Patient's preference for disposition at discharge is return home with supportive son.     05/28/25 0504   Service Assessment   Patient Orientation Alert and Oriented   Cognition Alert   History Provided By Child/Family   Primary Caregiver Family   Accompanied By/Relationship Son-Duane [place]   Support Systems Family Members   PCP Verified by CM Yes   Last Visit to PCP Within last 3 months   Prior Functional Level Assistance with the following:;Bathing;Dressing;Toileting;Feeding;Cooking;Housework;Shopping;Mobility   Current Functional Level Assistance with the following:;Bathing;Dressing;Toileting;Feeding;Cooking;Housework;Shopping;Mobility   Can patient return to prior living arrangement Yes   Ability to make needs known: Poor   Family able to assist with home care needs: Yes   Would you like for me to discuss the discharge plan with any other family members/significant others, and if so, who? Yes  (Son-Duane)   Financial Resources Medicare;Other (Comment)  ([place] [employer]   Community Resources None   CM/SW Referral Other (see comment)  (Discharge planning)   Social/Functional History   Lives With Son   Type of Home House   Home Layout One level   Home Access Stairs to enter without rails   Entrance Stairs - Number of Steps 3   Bathroom Shower/Tub Tub/Shower unit  (Son gives patient sponge baths)   Bathroom Toilet Standard   Bathroom Equipment None   Bathroom Accessibility Accessible

## 2025-05-28 NOTE — CONSULTS
[email or web address]  [phone]    GASTROENTEROLOGY CONSULT      Impression:   1. Coffee ground emesis? Described by patient's son as dark liquid brown  2. Abdominal pain  3. Dementia  4. Aortic stenosis s/p TAVR 2019      Plan:     1. Monitor H/H  2. Transfuse for Hgb<7  3. IV PPI BID  4. No plans for endoscopic evaluation in patient w/ advanced age, comorbidities and absence of active bleeding.   5. Continue medical management per primary team  D/w Dr. Garcia      Chief Complaint: coffee ground emesis      HPI:  Monet Humphrey is a 89 y.o. female who I am being asked to see in consultation for an opinion regarding coffee ground emesis. She is seen w/ son at bedside, patient sleeping and son provides history. She was brought to ER last night for acute onset abdominal pain and vomiting. Initially emesis contained liquid and food which appeared to be her lunch. EMS was called and checked patient and at that time transport was declined. About 2 hrs later, she developed recurrent vomiting which had appearance of dark brown liquid. EMS was again called and transported patient to ED for further evaluation. In ER found to have mild anemia w/ H/H 10.3/31.1, FOB negative.  CTA negative for active bleed or GI pathology. She was admitted for further management.     Son reports she had 1 prior episode of abdominal pain which occurred just over 2 weeks ago and was self limited. This past weekend she had some increased fatigue. Otherwise she has been in her usual state of health. She does not drink alcohol, smoke, take NSAIDs. She has good functional status and lives at home w/ her son but does most of her own ADLs and ambulates around the house for majority of the day.     She has a remote history of gastritis, Son provides her self written records of prior procedure dates - EGD 2/2003 by Dr. Burrell showed \"gastritis without bleeding.\"   He denies her experiencing change in bowel habits, rectal bleeding/melena, weight

## 2025-05-28 NOTE — PROGRESS NOTES
conducted an initial consultation and Spiritual Assessment for Monet Humphrey, who is a 89 y.o.,female. Patient's Primary Language is: English.   According to the patient's EMR Zoroastrian Affiliation is: Adventism.     The reason the Patient came to the hospital is:   Patient Active Problem List    Diagnosis Date Noted    Upper GI bleeding 05/28/2025    Hypertension 05/28/2025    Acute kidney injury 05/28/2025    Chronic heart failure with preserved ejection fraction (HCC) 05/28/2025        The  provided the following Interventions:  Initiated a relationship of care and support of his son.  Offered prayer and assurance of continued prayers on patient's behalf and son.  Chart reviewed.    The following outcomes where achieved:    Assessment:  There are no spiritual or Quaker issues which require intervention at this time.     Plan:  Chaplains will continue to follow and will provide pastoral care on an as needed/requested basis.   recommends bedside caregivers page  on duty if patient shows signs of acute spiritual or emotional distress.     Tung Sylvester  Spiritual Care   [phone]     Spiritual Health History and Assessment/Progress Note  [facility]    Initial Encounter, Family Care,  ,  ,      Name: Monet Humphrey MRN: [id number]    Age: 89 y.o.     Sex: female   Language: English   Scientology: Adventism   Coffee ground emesis     Date: 5/28/2025            Total Time Calculated: 13 min              Spiritual Assessment began in [facility] 2S TELEMETRY            Encounter Overview/Reason: Initial Encounter, Family Care  Service Provided For: Family    Sana, Belief, Meaning:   Patient unable to assess at this time  Family/Friends identify as spiritual and are connected with a sana tradition or spiritual practice      Importance and Influence:  Patient unable to assess at this time  Family/Friends have spiritual/personal beliefs that influence decisions

## 2025-05-28 NOTE — PROGRESS NOTES
Patient is new to me today.  She was seen and examined at the bedside in follow-up for suspected upper GI bleeding.  The patient was admitted earlier this morning by my colleague.  The patient's son Mr. Hathaway was also in the room.  The patient reported feeling well overall.  She has tolerated a diet without any difficulty.  She denied any nausea, vomiting, abdominal pain, chest pain or shortness of breath.    Physical exam: Patient appears frail with generalized pallor; patient is hard of hearing; S1, S2 auscultated; lungs are clear to auscultation anteriorly; skin is warm and dry; moves extremity spontaneously    - Patient has been evaluated by gastroenterology who recommended conservative management, in light of the patient's age and risk of complications with invasive procedures.  Moreover, patient's hemoglobin and hematocrit appear to be stable.  The patient has been started on pantoprazole.  Continue to monitor hemoglobin and hematocrit.  -Palliative care consulted to discuss goals of care  -PT/OT evaluation requested  - Patient may also have underlying chronic kidney disease but her renal function at baseline is not known.  Continue IV fluids.  Monitor intake and output.  Minimize use of nephrotoxins.  Hold ramipril.    Anticipate discharge tomorrow to home/SNF.  Please refer to the H&P by my colleague for additional details.

## 2025-05-28 NOTE — H&P
Hospitalist Admission History and Physical    NAME:  Monet Humphrey   :   1923   MRN:   [id number]     PCP:  Imani Hall MD  Date/Time:  2025 7:32 AM  Subjective:   CHIEF COMPLAINT:      HISTORY OF PRESENT ILLNESS:     Monet is a 89 y.o.   female who presents with concern for coffee ground emesis witness by son after lunch. She was brought in by EMS. Son at bedside report h/o dementia.  Pt uses rollator and son reports she has had some abdominal discomfort.  The patient's son reports that he saw at least 6 episodes of coffee-ground emesis.        Past Medical History:   Diagnosis Date    Anemia     Aortic stenosis     Arthritis     Gastritis     History of transcatheter aortic valve replacement (TAVR)     Hyperlipidemia     Hypertension     Osteoporosis     Paget's bone disease     Spinal stenosis         Past Surgical History:   Procedure Laterality Date    BLADDER SUSPENSION      TRANSCATHETER AORTIC VALVE REPLACEMENT         Social History     Tobacco Use    Smoking status: Never    Smokeless tobacco: Never   Substance Use Topics    Alcohol use: Never        History reviewed. No pertinent family history.     No Known Allergies     None       REVIEW OF SYSTEMS:     [] Unable to obtain  ROS due to  []mental status change  []sedated   []intubated   [x]Total of 12 systems reviewed as follows:  Constitutional:  negative fever, negative chills, negative weight loss  Eyes:   negative visual changes  ENT:   negative sore throat, tongue or lip swelling  Respiratory:  negative cough, negative dyspnea  Cards:   negative for chest pain, palpitations, lower extremity edema  GI:   Patient had some nausea and vomiting a coffee ground emesis, abdominal tenderness near epigastrium  genitourinary: negative for frequency, dysuria  Integument:  negative for rash and pruritus  Hematologic:  negative for easy bruising and gum/nose bleeding  Musculoskel: negative for myalgias,  back pain and muscle

## 2025-05-28 NOTE — CONSULTS
Palliative Medicine  Patient Name: Monet Humphrey  YOB: 1923  MRN: [id number]  Age: 89 y.o.  Gender: female    Date of Initial Consult: 5/28/2025   Date of Service: 5/28/2025  Time: 3:35 PM  Provider: DAISHA Gross NP  Hospital Day: 2  Admit Date: 5/27/2025  Referring Provider: Dr Garcia        Reasons for Consultation:  Goals of Care    HISTORY OF PRESENT ILLNESS (HPI):   Monet Humphrey is a 89 y.o. female with a past medical history of anemia, aortic stenosis, arthritis, hypertension, Paget's bone disease, spinal stenosis, who was admitted on 5/27/2025 from home with a diagnosis of GI bleed.  Patient presented from home with coffee-ground emesis after lunch.  She also endorsed some abdominal discomfort.  Son reports he saw at least 6  episodes of coffee-ground emesis.  She has noted to have a hemoglobin of 10.3 on admission.  Palliative medicine is consulted for goals of care in a patient who is 89 years old.    May 28, 2025 Patient is alert however she confused fell asleep during conversation. Her son Duane at bedside.         PALLIATIVE DIAGNOSES:    Encounter for palliative care / advanced care plan discussions   Goals of care   GI bleed / coffee ground emesis   Advanced age     ASSESSMENT AND PLAN:   Encounter for palliative care / advanced care plan discussions     May 28, 2025 Patient seen along with Ms Rossy RN. She is alert, but confused. Fell asleep during conversation her son Duane is at bedside. Son was able to provide history. Patient appears comfortable and will answer simple questions but is confused.     AMD no amd on file. Her son tells us she has previously completed an AMD naming Duane as medical power of  and her other son is secondary.  However, he is not 100% sure of this and will review and bring in document for our files tomorrow.  Patient is a  she has 2 sons.  She currently lives with Duane. Currently Duane is point person for medical

## 2025-05-28 NOTE — PLAN OF CARE
Problem: Discharge Planning  Goal: Discharge to home or other facility with appropriate resources  5/28/2025 1043 by Andra Marcano RN  Outcome: Progressing  5/28/2025 0505 by Deborah Yanez RN  Outcome: Progressing     Problem: Pain  Goal: Verbalizes/displays adequate comfort level or baseline comfort level  5/28/2025 1043 by Andra Marcano RN  Outcome: Progressing  5/28/2025 0505 by Deborah Yanez RN  Outcome: Progressing     Problem: Skin/Tissue Integrity  Goal: Skin integrity remains intact  Description: 1.  Monitor for areas of redness and/or skin breakdown2.  Assess vascular access sites hourly3.  Every 4-6 hours minimum:  Change oxygen saturation probe site4.  Every 4-6 hours:  If on nasal continuous positive airway pressure, respiratory therapy assess nares and determine need for appliance change or resting period  5/28/2025 1043 by Andra Marcano RN  Outcome: Progressing  5/28/2025 0505 by Deborah Yanez RN  Outcome: Progressing     Problem: Neurosensory - Adult  Goal: Achieves stable or improved neurological status  5/28/2025 1043 by Andra Marcano RN  Outcome: Progressing  5/28/2025 0505 by Deborah Yanez RN  Outcome: Progressing     Problem: Cardiovascular - Adult  Goal: Maintains optimal cardiac output and hemodynamic stability  5/28/2025 1043 by Andra Marcano RN  Outcome: Progressing  5/28/2025 0505 by Deborah Yanez RN  Outcome: Progressing     Problem: Skin/Tissue Integrity - Adult  Goal: Skin integrity remains intact  Description: 1.  Monitor for areas of redness and/or skin breakdown2.  Assess vascular access sites hourly3.  Every 4-6 hours minimum:  Change oxygen saturation probe site4.  Every 4-6 hours:  If on nasal continuous positive airway pressure, respiratory therapy assess nares and determine need for appliance change or resting period  5/28/2025 1043 by Andra Marcano RN  Outcome: Progressing  5/28/2025 0505 by Deborah Yanez RN  Outcome: Progressing     Problem: Musculoskeletal -

## 2025-05-28 NOTE — PROGRESS NOTES
Telephone report received from Melissa Rubio RN. Patient reported alert and oriented, reviewed SBAR, VS, labs and ED summary of care.  0530 Unable to complete admission questions. Patient has some confusion to place, time and situation. No family members at BS.

## 2025-05-28 NOTE — ED TRIAGE NOTES
Patient brought in by EMS with abdominal pain she has had abdominal pain since 1 pm today, she vomited about 3 pm it was originally food particles but then progressed to coffee ground emesis.

## 2025-05-28 NOTE — PLAN OF CARE
Problem: Discharge Planning  Goal: Discharge to home or other facility with appropriate resources  Outcome: Progressing     Problem: Pain  Goal: Verbalizes/displays adequate comfort level or baseline comfort level  Outcome: Progressing     Problem: Skin/Tissue Integrity  Goal: Skin integrity remains intact  Description: 1.  Monitor for areas of redness and/or skin breakdown2.  Assess vascular access sites hourly3.  Every 4-6 hours minimum:  Change oxygen saturation probe site4.  Every 4-6 hours:  If on nasal continuous positive airway pressure, respiratory therapy assess nares and determine need for appliance change or resting period  Outcome: Progressing     Problem: Neurosensory - Adult  Goal: Achieves stable or improved neurological status  Outcome: Progressing     Problem: Cardiovascular - Adult  Goal: Maintains optimal cardiac output and hemodynamic stability  Outcome: Progressing     Problem: Skin/Tissue Integrity - Adult  Goal: Skin integrity remains intact  Description: 1.  Monitor for areas of redness and/or skin breakdown2.  Assess vascular access sites hourly3.  Every 4-6 hours minimum:  Change oxygen saturation probe site4.  Every 4-6 hours:  If on nasal continuous positive airway pressure, respiratory therapy assess nares and determine need for appliance change or resting period  Outcome: Progressing     Problem: Musculoskeletal - Adult  Goal: Return mobility to safest level of function  Outcome: Progressing     Problem: Metabolic/Fluid and Electrolytes - Adult  Goal: Electrolytes maintained within normal limits  Outcome: Progressing  Goal: Hemodynamic stability and optimal renal function maintained  Outcome: Progressing  Goal: Glucose maintained within prescribed range  Outcome: Progressing     Problem: Hematologic - Adult  Goal: Maintains hematologic stability  Outcome: Progressing     Problem: Confusion  Goal: Confusion, delirium, dementia, or psychosis is improved or at baseline  Description:  INTERVENTIONS:1. Assess for possible contributors to thought disturbance, including medications, impaired vision or hearing, underlying metabolic abnormalities, dehydration, psychiatric diagnoses, and notify attending LIP2. [place] high risk fall precautions, as indicated3. Provide frequent short contacts to provide reality reorientation, refocusing and direction4. Decrease environmental stimuli, including noise as appropriate5. Monitor and intervene to maintain adequate nutrition, hydration, elimination, sleep and activity6. If unable to ensure safety without constant attention obtain sitter and review sitter guidelines with assigned personnel7. Initiate Psychosocial CNS and Spiritual Care consult, as indicated  Outcome: Progressing

## 2025-05-28 NOTE — PROGRESS NOTES
4 Eyes Skin Assessment     NAME:  Monet Humphrey  YOB: 1923  MEDICAL RECORD NUMBER:  [id number]    The patient is being assessed for  Shift Handoff    I agree that at least one RN has performed a thorough Head to Toe Skin Assessment on the patient. ALL assessment sites listed below have been assessed.      Areas assessed by both nurses:    Head, Face, Ears, Shoulders, Back, Chest, Arms, Elbows, Hands, Sacrum. Buttock, Coccyx, Ischium, and Legs. Feet and Heels        Does the Patient have a Wound? No noted wound(s)       Pranav Prevention initiated by RN: No  Wound Care Orders initiated by RN: No    Pressure Injury (Stage 3,4, Unstageable, DTI, NWPT, and Complex wounds) if present, place Wound referral order by RN under : No    New Ostomies, if present place, Ostomy referral order under : No     Nurse 1 eSignature: Electronically signed by Andra Marcano RN on 5/28/25 at 7:14 PM EDT    **SHARE this note so that the co-signing nurse can place an eSignature**    Nurse 2 eSignature: {Esignature:[id number]}

## 2025-05-28 NOTE — ACP (ADVANCE CARE PLANNING)
Advance Care Planning     Palliative Team Advance Care Planning (ACP) Conversation    Date of Conversation: 05/28/25    Individuals present for the conversation: Patient and Son Duane Holderfield     ACP documents on file prior to discussion:  -None    Previously completed document/s not on file:  Advance Medical Directive  document was completed previously but it is not available for review. This writer requested a copy of the document for patient's chart.     Healthcare Decision Maker:    Primary Decision Maker: Holderfield,Duane - Child - [phone] Son states he has a copy of AMD at home naming him as primary Healthcare agent and his brother as surrogate.     Conversation Summary:   Monet Humphrey is a 89 year old female admitted from home via the ED by with abdominal pain and vomiting of food particles but then progressed to coffee ground emesis. GI was consulted -  No plans for endoscopic evaluation in patient w/ advanced age, comorbidities and absence of active bleeding. Palliative Medicine Team members Kenna Tapia, YOANDY and Caity Blair, RN were consulted for goals of care. Patient alerted to our greeting and shared her name, Monet. She did not engage with Palliative team during lengthy interview with son, Duane.  They reside together. Per son Patient is up and about with her Rolator and able to perform her ALDs with min assist including eating.  Team addressed goals of care and goals status. Discussed the benefits and burdens of intubation and CPR in the event of respiratory decline or cardiopulmonary arrest in a person with advanced age. Duane stated he will need time to review her AMD paperwork at home. Team provided him printed information on CPR, Ventilator, copy of AMD, DDNR form and hand out on Advance Care Planning. He stated he would need to speak to the  first before making any changes. Palliative team repeated the efforts of CPR and intubation in a person with advanced age is often not a

## 2025-05-29 VITALS
OXYGEN SATURATION: 97 % | SYSTOLIC BLOOD PRESSURE: 143 MMHG | HEART RATE: 71 BPM | TEMPERATURE: 97.7 F | DIASTOLIC BLOOD PRESSURE: 88 MMHG | BODY MASS INDEX: 23.69 KG/M2 | WEIGHT: 117.5 LBS | HEIGHT: 59 IN | RESPIRATION RATE: 18 BRPM

## 2025-05-29 LAB
ALBUMIN SERPL-MCNC: 2.8 G/DL (ref 3.4–5)
ALBUMIN/GLOB SERPL: 0.6 (ref 0.8–1.7)
ALP SERPL-CCNC: 52 U/L (ref 45–117)
ALT SERPL-CCNC: 10 U/L (ref 10–35)
ANION GAP SERPL CALC-SCNC: 9 MMOL/L (ref 3–18)
APTT PPP: 27 SEC (ref 23–36.4)
AST SERPL-CCNC: 26 U/L (ref 10–38)
BASOPHILS # BLD: 0.02 K/UL (ref 0–0.1)
BASOPHILS NFR BLD: 0.2 % (ref 0–2)
BILIRUB SERPL-MCNC: 0.5 MG/DL (ref 0.2–1)
BUN SERPL-MCNC: 31 MG/DL (ref 6–23)
BUN/CREAT SERPL: 24 (ref 12–20)
CALCIUM SERPL-MCNC: 9 MG/DL (ref 8.5–10.1)
CHLORIDE SERPL-SCNC: 107 MMOL/L (ref 98–107)
CO2 SERPL-SCNC: 23 MMOL/L (ref 21–32)
CREAT SERPL-MCNC: 1.28 MG/DL (ref 0.6–1.3)
DIFFERENTIAL METHOD BLD: ABNORMAL
EOSINOPHIL # BLD: 0.15 K/UL (ref 0–0.4)
EOSINOPHIL NFR BLD: 1.7 % (ref 0–5)
ERYTHROCYTE [DISTWIDTH] IN BLOOD BY AUTOMATED COUNT: 13.6 % (ref 11.6–14.5)
GLOBULIN SER CALC-MCNC: 4.9 G/DL (ref 2–4)
GLUCOSE SERPL-MCNC: 95 MG/DL (ref 74–108)
HCT VFR BLD AUTO: 29.6 % (ref 35–45)
HGB BLD-MCNC: 9.4 G/DL (ref 12–16)
IMM GRANULOCYTES # BLD AUTO: 0.02 K/UL (ref 0–0.04)
IMM GRANULOCYTES NFR BLD AUTO: 0.2 % (ref 0–0.5)
INR PPP: 1.1 (ref 0.9–1.1)
IRON SATN MFR SERPL: 13 %
IRON SERPL-MCNC: 28 UG/DL (ref 50–175)
LYMPHOCYTES # BLD: 1.51 K/UL (ref 0.9–3.6)
LYMPHOCYTES NFR BLD: 17.3 % (ref 21–52)
MCH RBC QN AUTO: 30.7 PG (ref 24–34)
MCHC RBC AUTO-ENTMCNC: 31.8 G/DL (ref 31–37)
MCV RBC AUTO: 96.7 FL (ref 78–100)
MONOCYTES # BLD: 0.73 K/UL (ref 0.05–1.2)
MONOCYTES NFR BLD: 8.4 % (ref 3–10)
NEUTS SEG # BLD: 6.31 K/UL (ref 1.8–8)
NEUTS SEG NFR BLD: 72.2 % (ref 40–73)
NRBC # BLD: 0 K/UL (ref 0–0.01)
NRBC BLD-RTO: 0 PER 100 WBC
PLATELET # BLD AUTO: 271 K/UL (ref 135–420)
PMV BLD AUTO: 10.9 FL (ref 9.2–11.8)
POTASSIUM SERPL-SCNC: 3.8 MMOL/L (ref 3.5–5.5)
PROT SERPL-MCNC: 7.7 G/DL (ref 6.4–8.2)
PROTHROMBIN TIME: 14.3 SEC (ref 11.9–14.9)
RBC # BLD AUTO: 3.06 M/UL (ref 4.2–5.3)
SODIUM SERPL-SCNC: 139 MMOL/L (ref 136–145)
TIBC SERPL-MCNC: 222 UG/DL (ref 250–450)
UIBC SERPL-MCNC: 194 UG/DL (ref 112–347)
WBC # BLD AUTO: 8.7 K/UL (ref 4.6–13.2)

## 2025-05-29 PROCEDURE — 99239 HOSP IP/OBS DSCHRG MGMT >30: CPT | Performed by: STUDENT IN AN ORGANIZED HEALTH CARE EDUCATION/TRAINING PROGRAM

## 2025-05-29 PROCEDURE — 83550 IRON BINDING TEST: CPT

## 2025-05-29 PROCEDURE — 80053 COMPREHEN METABOLIC PANEL: CPT

## 2025-05-29 PROCEDURE — 99233 SBSQ HOSP IP/OBS HIGH 50: CPT | Performed by: NURSE PRACTITIONER

## 2025-05-29 PROCEDURE — 94761 N-INVAS EAR/PLS OXIMETRY MLT: CPT

## 2025-05-29 PROCEDURE — 85610 PROTHROMBIN TIME: CPT

## 2025-05-29 PROCEDURE — 85730 THROMBOPLASTIN TIME PARTIAL: CPT

## 2025-05-29 PROCEDURE — 6370000000 HC RX 637 (ALT 250 FOR IP): Performed by: STUDENT IN AN ORGANIZED HEALTH CARE EDUCATION/TRAINING PROGRAM

## 2025-05-29 PROCEDURE — 2580000003 HC RX 258: Performed by: STUDENT IN AN ORGANIZED HEALTH CARE EDUCATION/TRAINING PROGRAM

## 2025-05-29 PROCEDURE — 97535 SELF CARE MNGMENT TRAINING: CPT

## 2025-05-29 PROCEDURE — 83540 ASSAY OF IRON: CPT

## 2025-05-29 PROCEDURE — 97116 GAIT TRAINING THERAPY: CPT

## 2025-05-29 PROCEDURE — 6360000002 HC RX W HCPCS: Performed by: STUDENT IN AN ORGANIZED HEALTH CARE EDUCATION/TRAINING PROGRAM

## 2025-05-29 PROCEDURE — 97161 PT EVAL LOW COMPLEX 20 MIN: CPT

## 2025-05-29 PROCEDURE — 85025 COMPLETE CBC W/AUTO DIFF WBC: CPT

## 2025-05-29 PROCEDURE — 36415 COLL VENOUS BLD VENIPUNCTURE: CPT

## 2025-05-29 PROCEDURE — 2500000003 HC RX 250 WO HCPCS: Performed by: INTERNAL MEDICINE

## 2025-05-29 PROCEDURE — 97166 OT EVAL MOD COMPLEX 45 MIN: CPT

## 2025-05-29 RX ORDER — PANTOPRAZOLE SODIUM 40 MG/1
40 TABLET, DELAYED RELEASE ORAL EVERY MORNING
Qty: 30 TABLET | Refills: 1 | Status: SHIPPED | OUTPATIENT
Start: 2025-05-29 | End: 2025-07-28

## 2025-05-29 RX ADMIN — METOPROLOL SUCCINATE 50 MG: 50 TABLET, EXTENDED RELEASE ORAL at 08:10

## 2025-05-29 RX ADMIN — FERROUS SULFATE TAB 325 MG (65 MG ELEMENTAL FE) 325 MG: 325 (65 FE) TAB at 08:10

## 2025-05-29 RX ADMIN — SODIUM CHLORIDE, PRESERVATIVE FREE 10 ML: 5 INJECTION INTRAVENOUS at 08:14

## 2025-05-29 RX ADMIN — Medication 1000 UNITS: at 08:10

## 2025-05-29 RX ADMIN — Medication 600 MG: at 08:09

## 2025-05-29 RX ADMIN — SODIUM CHLORIDE 40 MG: 9 INJECTION, SOLUTION INTRAMUSCULAR; INTRAVENOUS; SUBCUTANEOUS at 01:09

## 2025-05-29 RX ADMIN — SODIUM CHLORIDE 40 MG: 9 INJECTION, SOLUTION INTRAMUSCULAR; INTRAVENOUS; SUBCUTANEOUS at 11:52

## 2025-05-29 ASSESSMENT — PAIN SCALES - PAIN ASSESSMENT IN ADVANCED DEMENTIA (PAINAD)
TOTALSCORE: 0
BODYLANGUAGE: RELAXED
TOTALSCORE: 0
BODYLANGUAGE: RELAXED
BREATHING: NORMAL
CONSOLABILITY: NO NEED TO CONSOLE
TOTALSCORE: 0
BODYLANGUAGE: RELAXED
CONSOLABILITY: NO NEED TO CONSOLE
FACIALEXPRESSION: SMILING OR INEXPRESSIVE
BREATHING: NORMAL
FACIALEXPRESSION: SMILING OR INEXPRESSIVE
CONSOLABILITY: NO NEED TO CONSOLE
BREATHING: NORMAL
FACIALEXPRESSION: SMILING OR INEXPRESSIVE

## 2025-05-29 ASSESSMENT — PAIN SCALES - GENERAL
PAINLEVEL_OUTOF10: 0

## 2025-05-29 NOTE — PLAN OF CARE
Problem: Discharge Planning  Goal: Discharge to home or other facility with appropriate resources  5/29/2025 0948 by Andra Marcano RN  Outcome: Progressing  5/29/2025 0116 by Deborah Yanez RN  Outcome: Progressing     Problem: Pain  Goal: Verbalizes/displays adequate comfort level or baseline comfort level  5/29/2025 0948 by Andra Marcano RN  Outcome: Progressing  5/29/2025 0116 by Deborah Yanez RN  Outcome: Progressing     Problem: Skin/Tissue Integrity  Goal: Skin integrity remains intact  Description: 1.  Monitor for areas of redness and/or skin breakdown2.  Assess vascular access sites hourly3.  Every 4-6 hours minimum:  Change oxygen saturation probe site4.  Every 4-6 hours:  If on nasal continuous positive airway pressure, respiratory therapy assess nares and determine need for appliance change or resting period  5/29/2025 0948 by Andra Marcano RN  Outcome: Progressing  5/29/2025 0116 by Deborah Yanez RN  Outcome: Progressing     Problem: Neurosensory - Adult  Goal: Achieves stable or improved neurological status  5/29/2025 0948 by Andra Marcano RN  Outcome: Progressing  5/29/2025 0116 by Deborah Yanez RN  Outcome: Progressing     Problem: Cardiovascular - Adult  Goal: Maintains optimal cardiac output and hemodynamic stability  5/29/2025 0948 by Andra Marcano RN  Outcome: Progressing  5/29/2025 0116 by Deborah Yanez RN  Outcome: Progressing     Problem: Skin/Tissue Integrity - Adult  Goal: Skin integrity remains intact  Description: 1.  Monitor for areas of redness and/or skin breakdown2.  Assess vascular access sites hourly3.  Every 4-6 hours minimum:  Change oxygen saturation probe site4.  Every 4-6 hours:  If on nasal continuous positive airway pressure, respiratory therapy assess nares and determine need for appliance change or resting period  5/29/2025 0948 by Andra Marcano RN  Outcome: Progressing  5/29/2025 0116 by Deborah Yanez RN  Outcome: Progressing     Problem: Musculoskeletal -  Adult  Goal: Return mobility to safest level of function  5/29/2025 0948 by Andra Marcano RN  Outcome: Progressing  5/29/2025 0116 by Deborah Yanez RN  Outcome: Progressing     Problem: Metabolic/Fluid and Electrolytes - Adult  Goal: Electrolytes maintained within normal limits  5/29/2025 0948 by Andra Marcano RN  Outcome: Progressing  5/29/2025 0116 by Deborah Yanez RN  Outcome: Progressing  Goal: Hemodynamic stability and optimal renal function maintained  5/29/2025 0948 by Andra Marcano RN  Outcome: Progressing  5/29/2025 0116 by Deborah Yanez RN  Outcome: Progressing  Goal: Glucose maintained within prescribed range  5/29/2025 0948 by Andra Marcano RN  Outcome: Progressing  5/29/2025 0116 by Deborah Yanez RN  Outcome: Progressing     Problem: Hematologic - Adult  Goal: Maintains hematologic stability  5/29/2025 0948 by Andra Marcano RN  Outcome: Progressing  5/29/2025 0116 by Deborah Yanez RN  Outcome: Progressing     Problem: Confusion  Goal: Confusion, delirium, dementia, or psychosis is improved or at baseline  Description: INTERVENTIONS:1. Assess for possible contributors to thought disturbance, including medications, impaired vision or hearing, underlying metabolic abnormalities, dehydration, psychiatric diagnoses, and notify attending LIP2. [place] high risk fall precautions, as indicated3. Provide frequent short contacts to provide reality reorientation, refocusing and direction4. Decrease environmental stimuli, including noise as appropriate5. Monitor and intervene to maintain adequate nutrition, hydration, elimination, sleep and activity6. If unable to ensure safety without constant attention obtain sitter and review sitter guidelines with assigned personnel7. Initiate Psychosocial CNS and Spiritual Care consult, as indicated  5/29/2025 0948 by Andra Marcano RN  Outcome: Progressing  5/29/2025 0116 by Deborah Yanez RN  Outcome: Progressing     Problem: ABCDS Injury Assessment  Goal:

## 2025-05-29 NOTE — PROGRESS NOTES
Physical Therapy  PHYSICAL THERAPY EVALUATION/DISCHARGE    Patient: Monet Humphrey (89 y.o. female)  Date: 5/29/2025  Primary Diagnosis: Upper GI bleeding [K92.2]  Coffee ground emesis [K92.0]  Stage 3b chronic kidney disease (HCC) [N18.32]       Precautions: General Precautions, Fall Risk,  ,  ,  ,  ,  ,  ,    PLOF: Lives with son who is caregiver. Mod I for short household distanced.      ASSESSMENT AND RECOMMENDATIONS:  Pt cleared by nursing. Pt received in bed in NAD, educated on PT role and evaluation process and agreeable. Pt supervision to EOB, increase time and cueing due to [place]. Standings with supervision and ambulates for a short distance with RW. Returns to EOB and returns to supine. Call bell and all needs left within reach. Pt is at functional baseline and skilled acute care PT is not indicated at this time, will sign off.       Patient does not require further skilled physical therapy intervention at this level of care.    Further Equipment Recommendations for Discharge: Patient has all needed DME for home safety.    [facility]: AM-PAC Inpatient Mobility Raw Score : 18      Current research shows that an AM-PAC score of 18 (14 without stairs) or greater is associated with a discharge to the patient's home setting.    This AMPA score should be considered in conjunction with interdisciplinary team recommendations to determine the most appropriate discharge setting. Patient's social support, diagnosis, medical stability, and prior level of function should also be taken into consideration.     SUBJECTIVE:   Patient stated “walk? .”    OBJECTIVE DATA SUMMARY:     Past Medical History:   Diagnosis Date    Anemia     Aortic stenosis     Arthritis     Gastritis     History of transcatheter aortic valve replacement (TAVR)     Hyperlipidemia     Hypertension     Osteoporosis     Paget's bone disease     Spinal stenosis      Past Surgical History:   Procedure Laterality Date    BLADDER SUSPENSION

## 2025-05-29 NOTE — PLAN OF CARE
Problem: Discharge Planning  Goal: Discharge to home or other facility with appropriate resources  Outcome: Progressing     Problem: Pain  Goal: Verbalizes/displays adequate comfort level or baseline comfort level  Outcome: Progressing     Problem: Skin/Tissue Integrity  Goal: Skin integrity remains intact  Description: 1.  Monitor for areas of redness and/or skin breakdown2.  Assess vascular access sites hourly3.  Every 4-6 hours minimum:  Change oxygen saturation probe site4.  Every 4-6 hours:  If on nasal continuous positive airway pressure, respiratory therapy assess nares and determine need for appliance change or resting period  Outcome: Progressing     Problem: Neurosensory - Adult  Goal: Achieves stable or improved neurological status  Outcome: Progressing     Problem: Cardiovascular - Adult  Goal: Maintains optimal cardiac output and hemodynamic stability  Outcome: Progressing     Problem: Skin/Tissue Integrity - Adult  Goal: Skin integrity remains intact  Description: 1.  Monitor for areas of redness and/or skin breakdown2.  Assess vascular access sites hourly3.  Every 4-6 hours minimum:  Change oxygen saturation probe site4.  Every 4-6 hours:  If on nasal continuous positive airway pressure, respiratory therapy assess nares and determine need for appliance change or resting period  Outcome: Progressing     Problem: Musculoskeletal - Adult  Goal: Return mobility to safest level of function  Outcome: Progressing     Problem: Metabolic/Fluid and Electrolytes - Adult  Goal: Electrolytes maintained within normal limits  Outcome: Progressing  Goal: Hemodynamic stability and optimal renal function maintained  Outcome: Progressing  Goal: Glucose maintained within prescribed range  Outcome: Progressing     Problem: Hematologic - Adult  Goal: Maintains hematologic stability  Outcome: Progressing     Problem: Confusion  Goal: Confusion, delirium, dementia, or psychosis is improved or at baseline  Description:

## 2025-05-29 NOTE — PROGRESS NOTES
OCCUPATIONAL THERAPY EVALUATION/DISCHARGE    Patient: Monet Humphrey (89 y.o. female)  Date: 5/29/2025  Primary Diagnosis: Upper GI bleeding [K92.2]  Coffee ground emesis [K92.0]  Stage 3b chronic kidney disease (HCC) [N18.32]  Precautions: General Precautions, Fall Risk  PLOF: Pt lives with son in 1 , 3 [address] and needed set-up/ assist with self-care and used a rollator for functional mobility. PLOF obtained from pt's son/caregiver (Duane) d/t patient's baseline cognition.     ASSESSMENT AND RECOMMENDATIONS:  Pt cleared to participate in OT evaluation by RN. Pt supine in bed, alert, and agreeable to participate with supportive son (caregiver) present. Patient oriented to person only, re-orientation given via use of whiteboard. Patient [employer], right hearing aide donned. Patient follows commands well however required moderate cues for sequencing tasks and minimal cues for task initiation which per son is baseline. Patient contact guard assist for supine <> sit, min assist for scooting, min assist for donning BLE shoes and min assist for sit <> stand. Patient contact guard - min assist using RW d/t physical assistance with maneuvering as per son pt \"used to rollator wheels\". Based on the objective data described below, the patient presents at functional baseline for self-care tasks. Pt left all needs met and call bell in reach.      Maximum therapeutic gains met at current level of care and patient will be discharged from occupational therapy at this time.    Further Equipment Recommendations for Discharge: Patient has all needed DME for home safety.    AMPA: AM-PAC Inpatient Daily Activity Raw Score: 18    At this time and based on an AM-PAC score, no further OT is recommended upon discharge.  Recommend patient returns to prior setting with prior services.    This AMPA score should be considered in conjunction with interdisciplinary team recommendations to determine the most appropriate discharge setting.  deficits  Initiation: Requires cues for all  Sequencing: Requires cues for all    Skin: Intact  Edema: None noted    Vision/Perceptual:    Vision  Vision: Within Functional Limits (reading glasses)       Coordination: BUE  Coordination: Generally decreased, functional    Balance:  Balance  Sitting: Intact  Standing: Impaired  Standing - Static: Fair (+)  Standing - Dynamic: Fair    Strength: BUE  Strength: Generally decreased, functional    Tone & Sensation: BUE  Tone: Normal  Sensation: Intact    Range of Motion: BUE  AROM: Within functional limits    Functional Mobility and Transfers for ADLs:  Bed Mobility:  Bed Mobility Training  Bed Mobility Training: Yes  Supine to Sit: Contact guard assistance  Sit to Supine: Contact guard assistance  Scooting: Minimal assistance    Transfers:  Transfer Training  Transfer Training: Yes  Sit to Stand: Minimal assistance  Stand to Sit: Minimal assistance    ADL Assessment:   Feeding: Setup  Grooming: Setup;Stand by assistance  UE Bathing: Stand by assistance;Setup  LE Bathing: Minimal assistance  UE Dressing: Stand by assistance;Setup  LE Dressing: Minimal assistance  Toileting: Minimal assistance    Pain:  Intensity Pre-treatment: 0/10   Intensity Post-treatment: 0/10    Activity Tolerance:   Activity Tolerance: Treatment limited secondary to decreased cognition  Please refer to the flowsheet for vital signs taken during this treatment.    After treatment:   [] Patient left in no apparent distress sitting up in chair  [x] Patient left in no apparent distress in bed  [x] Call bell left within reach  [x] Nursing notified  [x] Caregiver present  [x] Bed/chair alarm activated    COMMUNICATION/EDUCATION:   Patient Education  Education Given To: Patient;Family;Caregiver  Education Provided: Role of Therapy;Plan of Care;Fall Prevention Strategies;Family Education;ADL Adaptive Strategies;Transfer Training;Orientation  Education Method: Demonstration;Verbal;Teach Back  Barriers to

## 2025-05-29 NOTE — PROGRESS NOTES
Patient with increase restlessness, attempts OOB, bed alarm on. Frequent rounds and reorientation to surroundings. Frequent incont care given.  0559 Patient assisted to BR, X 1, gait slightly unsteady and guarded. Patient bathed , incont of urine. Assisted back to bed, bed alarm on. Patient is pleasantly confuse.

## 2025-05-29 NOTE — PROGRESS NOTES
Verbal and written discharge instructions discussed with patient's son Duane. Questions asked and answered.

## 2025-05-29 NOTE — PROGRESS NOTES
Palliative Medicine  Patient Name: Monet Humphrey  YOB: 1923  MRN: [id number]  Age: 89 y.o.  Gender: female    Date of Initial Consult: 5/28/2025   Date of Service: 5/29/2025  Time: 12:48 PM  Provider: DAISHA Gross NP  Hospital Day: 3  Admit Date: 5/27/2025  Referring Provider: Dr Garcia        Reasons for Consultation:  Goals of Care    HISTORY OF PRESENT ILLNESS (HPI):   Monet Humphrey is a 89 y.o. female with a past medical history of anemia, aortic stenosis, arthritis, hypertension, Paget's bone disease, spinal stenosis, who was admitted on 5/27/2025 from home with a diagnosis of GI bleed.  Patient presented from home with coffee-ground emesis after lunch.  She also endorsed some abdominal discomfort.  Son reports he saw at least 6  episodes of coffee-ground emesis.  She has noted to have a hemoglobin of 10.3 on admission.  Palliative medicine is consulted for goals of care in a patient who is 89 years old.    May 29, 2025 Patient is alert but confused this am. Overall appears comfortable. Son Duane at bedside.     May 28, 2025 Patient is alert however she confused fell asleep during conversation. Her son Duane at bedside.         PALLIATIVE DIAGNOSES:    Encounter for palliative care / advanced care plan discussions   Goals of care   GI bleed / coffee ground emesis   Advanced age     ASSESSMENT AND PLAN:   Encounter for palliative care / advanced care plan discussions     May 29, 2025 Follow up on patient along Ms Rossy RN, Patient is alert smiling but confused. Her son Duane at bedside. He was able to bring in patient's previously completed AMD. He is named as primary MPOA and his brother Bret is secondary. We also reviewed the living will portion of AMD with Duane. However despite maximum education on the difference between AMD and DDNR he stated he \" was not going to sign anything\". We advised patient will remain a full code which is in contrast of her living will

## 2025-05-29 NOTE — DISCHARGE INSTRUCTIONS
DISCHARGE SUMMARY from Nurse    PATIENT INSTRUCTIONS:    After general anesthesia or intravenous sedation, for 24 hours or while taking prescription Narcotics:  Limit your activities  Do not drive and operate hazardous machinery  Do not make important personal or business decisions  Do  not drink alcoholic beverages  If you have not urinated within 8 hours after discharge, please contact your surgeon on call.    Report the following to your surgeon:  Excessive pain, swelling, redness or odor of or around the surgical area  Temperature over 100.5  Nausea and vomiting lasting longer than 4 hours or if unable to take medications  Any signs of decreased circulation or nerve impairment to extremity: change in color, persistent  numbness, tingling, coldness or increase pain  Any questions    What to do at Home:  Recommended activity: activity as tolerated    If you experience any of the following symptoms: increased bleeding, increased dizziness, please follow up with Primary care physician or return to ED.    *  Please give a list of your current medications to your Primary Care Provider.    *  Please update this list whenever your medications are discontinued, doses are      changed, or new medications (including over-the-counter products) are added.    *  Please carry medication information at all times in case of emergency situations.    These are general instructions for a healthy lifestyle:    No smoking/ No tobacco products/ Avoid exposure to second hand smoke  Surgeon General's Warning:  Quitting smoking now greatly reduces serious risk to your health.    Obesity, smoking, and sedentary lifestyle greatly increases your risk for illness    A healthy diet, regular physical exercise & weight monitoring are important for maintaining a healthy lifestyle    You may be retaining fluid if you have a history of heart failure or if you experience any of the following symptoms:  Weight gain of 3 pounds or more overnight or

## 2025-05-29 NOTE — ACP (ADVANCE CARE PLANNING)
Advance Care Planning     Palliative Team Advance Care Planning (ACP) Conversation    Date of Conversation: 05/29/25    Individuals present for the conversation: Patient and Son Duane Holderfield     ACP documents on file prior to discussion:  -None    Previously completed document/s not on file:  Advance Medical Directive  document was completed previously but it is not available for review. This writer requested a copy of the document for patient's chart.  Son, Duane provided a copy of Ms. Monet Humphrey's Advance Medical Directive. Palliative team member reviewed and read aloud the area of the document explaining patient's \"Declaration of Intent\" stating she does not was any \"life-prolonging procedures that would only serve to prolong the dying process\". Mr Duane Holderfield stated \" She had her wishes known and I'm not signing anything\". Palliative Team Members explained the difference between a legal document and a medical order to the honored by medical staff and EMS personnel.     Healthcare Decision Maker:    Primary Decision Maker: Holderfield,Duane - Child - [phone] Son provided a copy of his mother's AMD Naming him as primary agent and his brother, Bret Humphrey  as surrogate.     Conversation Summary:   Monet Humphrey is a 89 year old female admitted from home via the ED by with abdominal pain and vomiting of food particles but then progressed to coffee ground emesis. GI was consulted -  No plans for endoscopic evaluation in patient w/ advanced age, comorbidities and absence of active bleeding. Palliative Medicine Team members Kenna Tapia, YOANDY and Caity Blair, RN for follow up visit. Patient alerted to our greeting and shared her name, Monet. She did not engage with Palliative team. Son, Duane was at bedside. Palliative Team tired to educate Mr Humphrey on the benefits and burdens of CPR and Intubation. Also, subjecting her to those measures would not dorian with his mother's Advance

## 2025-05-30 PROBLEM — N18.31 STAGE 3A CHRONIC KIDNEY DISEASE (HCC): Status: ACTIVE | Noted: 2025-05-30

## 2025-05-30 PROBLEM — E78.5 DYSLIPIDEMIA: Status: ACTIVE | Noted: 2025-05-30

## 2025-05-30 NOTE — DISCHARGE SUMMARY
Hospitalist Discharge Summary      Patient: Monet Humphrey MRN: [id number]  CSN: [id number]    YOB: 1923  Age: 89 y.o.  Sex: female    DOA: 5/27/2025 LOS:  LOS: 1 day   Discharge Date:5/29/2025     Admission Diagnoses: Upper GI bleeding [K92.2]  Coffee ground emesis [K92.0]  Stage 3b chronic kidney disease (HCC) [N18.32]    Discharge Diagnoses:    Upper gastrointestinal bleeding  Chronic kidney disease stage IIIa  Essential hypertension  Dyslipidemia    Discharge Condition: Stable    Discharge To: Home    CODE STATUS: Prior         PHYSICAL EXAM  Visit Vitals  BP (!) 143/88   Pulse 71   Temp 97.7 °F (36.5 °C) (Oral)   Resp 18   Ht 1.499 m (4' 11\")   Wt 53.3 kg (117 lb 8 oz)   SpO2 97%   BMI 23.73 kg/m²                                     HPI per admitting physician:    Monet is a 89 y.o.   female who presents with concern for coffee ground emesis witness by son after lunch. She was brought in by EMS. Son at bedside report h/o dementia.  Pt uses rollator and son reports she has had some abdominal discomfort.  The patient's son reports that he saw at least 6 episodes of coffee-ground emesis    Hospital Course:     Patient was admitted under the hospital for suspected upper GI bleeding.  Gastroenterology were consulted but they recommended conservative management because of the patient's advanced age and stable hemoglobin.  Hemoglobin and hematocrit remained stable.  Patient was started on pantoprazole.  She was discharged on the same.    Patient had abnormal renal function on admission.  She was thought to have acute kidney injury.  Therefore, ramipril was placed on hold.  Patient was administered IV fluids, following which renal function improved.  Ramipril was restarted at discharge.    Patient has dyslipidemia and was continued on pravastatin.    Discharge plan discussed with patient/family who verbalized understanding.        FOLLOW-UP RECOMMENDATIONS:     Aung Baker MD  0304